# Patient Record
Sex: FEMALE | Race: WHITE | NOT HISPANIC OR LATINO | URBAN - NONMETROPOLITAN AREA
[De-identification: names, ages, dates, MRNs, and addresses within clinical notes are randomized per-mention and may not be internally consistent; named-entity substitution may affect disease eponyms.]

---

## 2017-05-09 ENCOUNTER — OFFICE VISIT (OUTPATIENT)
Dept: CARDIOLOGY | Facility: CLINIC | Age: 62
End: 2017-05-09
Payer: COMMERCIAL

## 2017-05-09 VITALS
WEIGHT: 117 LBS | HEART RATE: 61 BPM | OXYGEN SATURATION: 98 % | BODY MASS INDEX: 20.73 KG/M2 | DIASTOLIC BLOOD PRESSURE: 80 MMHG | HEIGHT: 63 IN | SYSTOLIC BLOOD PRESSURE: 120 MMHG

## 2017-05-09 DIAGNOSIS — Z13.6 SCREENING FOR ISCHEMIC HEART DISEASE: ICD-10-CM

## 2017-05-09 DIAGNOSIS — E78.5 OTHER AND UNSPECIFIED HYPERLIPIDEMIA: ICD-10-CM

## 2017-05-09 DIAGNOSIS — E03.9 HYPOTHYROIDISM, UNSPECIFIED TYPE: ICD-10-CM

## 2017-05-09 DIAGNOSIS — Z82.49 FAMILY HISTORY OF HEART DISEASE: ICD-10-CM

## 2017-05-09 LAB — EKG IMPRESSION: NORMAL

## 2017-05-09 PROCEDURE — 93000 ELECTROCARDIOGRAM COMPLETE: CPT | Performed by: INTERNAL MEDICINE

## 2017-05-09 PROCEDURE — 99204 OFFICE O/P NEW MOD 45 MIN: CPT | Performed by: INTERNAL MEDICINE

## 2017-05-09 RX ORDER — ALPRAZOLAM 0.5 MG/1
0.5 TABLET ORAL NIGHTLY PRN
Refills: 0 | COMMUNITY
Start: 2017-04-20 | End: 2024-01-11

## 2017-05-09 RX ORDER — ROSUVASTATIN CALCIUM 5 MG/1
5 TABLET, COATED ORAL EVERY EVENING
Qty: 30 TAB | Refills: 11 | Status: SHIPPED | OUTPATIENT
Start: 2017-05-09 | End: 2021-03-30

## 2017-05-09 RX ORDER — ZOLPIDEM TARTRATE 10 MG/1
10 TABLET ORAL NIGHTLY PRN
Refills: 0 | COMMUNITY
Start: 2017-03-16

## 2017-05-09 RX ORDER — ROSUVASTATIN CALCIUM 40 MG/1
40 TABLET, COATED ORAL DAILY
Qty: 30 TAB | Refills: 3 | Status: SHIPPED | OUTPATIENT
Start: 2017-05-09 | End: 2017-05-09

## 2017-05-09 RX ORDER — ROSUVASTATIN CALCIUM 40 MG/1
5 TABLET, COATED ORAL DAILY
Qty: 30 TAB | Refills: 4 | Status: SHIPPED | OUTPATIENT
Start: 2017-05-09 | End: 2017-05-09

## 2017-05-09 RX ORDER — LEVOTHYROXINE SODIUM 0.03 MG/1
25 TABLET ORAL
Refills: 0 | COMMUNITY
Start: 2017-05-07 | End: 2023-01-12 | Stop reason: SDUPTHER

## 2017-05-09 NOTE — PROGRESS NOTES
Cardiology Consult Note:    Fawn Dickinson  Date & Time note created:    5/9/2017   1:29 PM       Patient ID:  Name:             Ana María Philip     YOB: 1955  Age:                 62 y.o.  female   MRN:               [id number]                                                             Chief Complaint:   Chief Complaint   Patient presents with   • New Patient             History of Present Illness:   Ana María Philip has strong FHx CAD and herself has high cholesterol; Most recent; ; To establish with us to assess CV risk and management; active and denies cardiac sx's;       Review of Systems:     Constitutional: Denies fevers, Denies weight changes  Eyes: Denies changes in vision, no eye pain  Ears/Nose/Throat/Mouth: Denies nasal congestion or sore throat   Cardiovascular: Denies chest pain or palpitations   Respiratory: Denies shortness of breath , Denies cough  Gastrointestinal/Hepatic: Denies abdominal pain, nausea, vomiting, diarrhea, constipation or GI bleeding   Genitourinary: Denies bladder dysfunction, dysuria or frequency  Musculoskeletal/Rheum: Denies  joint pain and swelling   Skin/Breast: Denies rash, denies breast lumps or discharge  Neurological: Denies headache, confusion, memory loss or focal weakness/parasthesias  Psychiatric: denies mood disorder   Endocrine: denies hx of diabetes or thyroid dysfunction  Heme/Oncology/Lymph Nodes: Denies enlarged lymph nodes, denies bruising or known bleeding disorder  Allergic/Immunologic: Denies hx of allergies      All other systems were reviewed and are negative (AMA/CMS criteria)    Past Medical History:   History reviewed. No pertinent past medical history.      Past Surgical History:  History reviewed. No pertinent past surgical history.    Hospital Medications:    Current outpatient prescriptions:   •  zolpidem (AMBIEN) 10 MG Tab, Take 10 mg by mouth., Disp: , Rfl: 0  •  sertraline (ZOLOFT) 50 MG Tab, , Disp: , Rfl: 0  •  levothyroxine  "(SYNTHROID) 25 MCG Tab, , Disp: , Rfl: 0  •  alprazolam (XANAX) 0.5 MG Tab, , Disp: , Rfl: 0  •  rosuvastatin (CRESTOR) 40 MG tablet, Take 0.5 Tabs by mouth every day., Disp: 30 Tab, Rfl: 4    Current Outpatient Medications:  Current Outpatient Prescriptions   Medication Sig Dispense Refill   • zolpidem (AMBIEN) 10 MG Tab Take 10 mg by mouth.  0   • sertraline (ZOLOFT) 50 MG Tab   0   • levothyroxine (SYNTHROID) 25 MCG Tab   0   • alprazolam (XANAX) 0.5 MG Tab   0   • rosuvastatin (CRESTOR) 40 MG tablet Take 0.5 Tabs by mouth every day. 30 Tab 4     No current facility-administered medications for this visit.         Medication Allergy/Sensitivities:  Allergies   Allergen Reactions   • Pcn [Penicillins] Rash     Local reaction       Family History:  History reviewed. No pertinent family history.    Social History:  Social History     Social History   • Marital Status: Unknown     Spouse Name: N/A   • Number of Children: N/A   • Years of Education: N/A     Occupational History   • Not on file.     Social History Main Topics   • Smoking status: Never Smoker    • Smokeless tobacco: Never Used   • Alcohol Use: Yes      Comment: glass of week 4 to 5 times a week   • Drug Use: No   • Sexual Activity: Not Currently     Other Topics Concern   • Not on file     Social History Narrative   • No narrative on file         Physical Exam:  Vitals  Weight/BMI: Body mass index is 20.73 kg/(m^2).  Blood pressure 120/80, pulse 61, height 1.6 m (5' 3\"), weight 53.071 kg (117 lb), SpO2 98 %.  Filed Vitals:    05/09/17 1256   BP: 120/80   Pulse: 61   Height: 1.6 m (5' 3\")   Weight: 53.071 kg (117 lb)   SpO2: 98%     Oxygen Therapy:  Pulse Oximetry: 98 %  General Appearance:   Well developed, Well nourished, No acute distress, Non-toxic appearance.   HENT:  Normocephalic, Atraumatic, Oropharynx moist mucous membranes, Dentition: Mallampati 1 OP, Nose normal.    Eyes:  PERRLA, EOMI, Conjunctiva normal, No discharge.  Neck:  Normal range of " motion, No cervical tenderness, Supple, No stridor, no JVD .  No thyromegaly.  No carotid bruit.  Cardiovascular:  Normal heart rate, Normal rhythm,  S1, S2, no S3,  S4; No gallops; 1/6 SE murmurs at MLSB, No rubs, .   Extremitites with intact distal pulses, no cyanosis, clubbing or edema.  No heaves, thrills, HJR;  Peripheral pulses: carotid 2+, brachial 2+, radial 2+, ulnar 2+, femoral 2+, popliteal 2+, PT 2+, DP 2+;  Lungs:  Respiratory effort is normal. Normal breath sounds, breath sounds clear to auscultation bilaterally,  no rales, no rhonchi, no wheezing.   Abdomen: Bowel sounds normal, Soft, No tenderness, No guarding, No rebound, No masses, No hepatosplenomegaly.  Skin: Warm, Dry, No erythema, No rash, no induration or crepitus.  Neurologic: Alert & oriented x 3, Normal motor function, Normal sensory function, No focal deficits noted, cranial nerves II through XII are normal,  normal gait.  Psychiatric: Affect normal, Judgment normal, Mood normal.      Data Review:     Records reviewed and summarized in current documentation    Lab Data Review:  No results found for: SODIUM, POTASSIUM, CHLORIDE, CO2, GLUCOSE, BUN, CREATININE, BUNCREATRAT, GLOMRATE   No results found for: PROTHROMBTM, INR   No results found for: WBC, RBC, HEMOGLOBIN, HEMATOCRIT, MCV, MCH, MCHC, MPV, NEUTSPOLYS, LYMPHOCYTES, MONOCYTES, EOSINOPHILS, BASOPHILS, HYPOCHROMIA, ANISOCYTOSIS     Imaging/Procedures Review:    To my review shows:na    EKG To my review shows:SR  56 bpm    Assessment and Plan.   62 y.o. female has FHx CAD and hyperlipidemia; AHA 10 yr CV risk 9.1 % and will start on Low dose Crestor and titrate up; ; Long discussion about her activities, diet; Recheck FLP in 3 months;     1. Family history of heart disease    - EKG  - rosuvastatin (CRESTOR) 40 MG tablet; Take 0.5 Tabs by mouth every day.  Dispense: 30 Tab; Refill: 4    2. Hypothyroidism, unspecified type    - TSH; Future    3. Screening for ischemic heart disease    -  CBC WITHOUT DIFFERENTIAL  - COMP METABOLIC PANEL; Future  - LIPID PANEL    4. Other and unspecified hyperlipidemia    - rosuvastatin (CRESTOR) 40 MG tablet; Take 0.5 Tabs by mouth every day.  Dispense: 30 Tab; Refill: 4      1. Family history of heart disease  EKG    rosuvastatin (CRESTOR) 40 MG tablet    DISCONTINUED: rosuvastatin (CRESTOR) 40 MG tablet   2. Hypothyroidism, unspecified type  TSH   3. Screening for ischemic heart disease  CBC WITHOUT DIFFERENTIAL    COMP METABOLIC PANEL    LIPID PANEL   4. Other and unspecified hyperlipidemia  rosuvastatin (CRESTOR) 40 MG tablet    DISCONTINUED: rosuvastatin (CRESTOR) 40 MG tablet

## 2017-05-09 NOTE — Clinical Note
[facility] [address],   [address]nd [place] [address]  Phone: [phone]  Fax: [phone]              Ana María Philip  1955    Encounter Date: 5/9/2017    Mari Smith M.D.    Thank you for the referral. I had the pleasure of seeing Ana María Philip today in cardiology clinic. I've attached my visit note below. If you have any questions please feel free to give me a call anytime.      Fawn Dickinson MD, PhD, [facility]  Cardiology and Lipidology  [facility]                                                                  PROGRESS NOTE:  Cardiology Consult Note:    Fawn Dickinson  Date & Time note created:    5/9/2017   1:29 PM       Patient ID:  Name:             Ana María Philip     YOB: 1955  Age:                 62 y.o.  female   MRN:               [id number]                                                             Chief Complaint:   Chief Complaint   Patient presents with   • New Patient             History of Present Illness:   Ana María Philip has strong FHx CAD and herself has high cholesterol; Most recent; ; To establish with us to assess CV risk and management; active and denies cardiac sx's;       Review of Systems:     Constitutional: Denies fevers, Denies weight changes  Eyes: Denies changes in vision, no eye pain  Ears/Nose/Throat/Mouth: Denies nasal congestion or sore throat   Cardiovascular: Denies chest pain or palpitations   Respiratory: Denies shortness of breath , Denies cough  Gastrointestinal/Hepatic: Denies abdominal pain, nausea, vomiting, diarrhea, constipation or GI bleeding   Genitourinary: Denies bladder dysfunction, dysuria or frequency  Musculoskeletal/Rheum: Denies  joint pain and swelling   Skin/Breast: Denies rash, denies breast lumps or discharge  Neurological: Denies headache, confusion, memory loss or focal weakness/parasthesias  Psychiatric: denies  mood disorder   Endocrine: denies hx of diabetes or thyroid dysfunction  Heme/Oncology/Lymph Nodes: Denies enlarged lymph nodes, denies bruising or known bleeding disorder  Allergic/Immunologic: Denies hx of allergies      All other systems were reviewed and are negative (AMA/CMS criteria)    Past Medical History:   History reviewed. No pertinent past medical history.      Past Surgical History:  History reviewed. No pertinent past surgical history.    Hospital Medications:    Current outpatient prescriptions:   •  zolpidem (AMBIEN) 10 MG Tab, Take 10 mg by mouth., Disp: , Rfl: 0  •  sertraline (ZOLOFT) 50 MG Tab, , Disp: , Rfl: 0  •  levothyroxine (SYNTHROID) 25 MCG Tab, , Disp: , Rfl: 0  •  alprazolam (XANAX) 0.5 MG Tab, , Disp: , Rfl: 0  •  rosuvastatin (CRESTOR) 40 MG tablet, Take 0.5 Tabs by mouth every day., Disp: 30 Tab, Rfl: 4    Current Outpatient Medications:  Current Outpatient Prescriptions   Medication Sig Dispense Refill   • zolpidem (AMBIEN) 10 MG Tab Take 10 mg by mouth.  0   • sertraline (ZOLOFT) 50 MG Tab   0   • levothyroxine (SYNTHROID) 25 MCG Tab   0   • alprazolam (XANAX) 0.5 MG Tab   0   • rosuvastatin (CRESTOR) 40 MG tablet Take 0.5 Tabs by mouth every day. 30 Tab 4     No current facility-administered medications for this visit.         Medication Allergy/Sensitivities:  Allergies   Allergen Reactions   • Pcn [Penicillins] Rash     Local reaction       Family History:  History reviewed. No pertinent family history.    Social History:  Social History     Social History   • Marital Status: Unknown     Spouse Name: N/A   • Number of Children: N/A   • Years of Education: N/A     Occupational History   • Not on file.     Social History Main Topics   • Smoking status: Never Smoker    • Smokeless tobacco: Never Used   • Alcohol Use: Yes      Comment: glass of week 4 to 5 times a week   • Drug Use: No   • Sexual Activity: Not Currently     Other Topics Concern   • Not on file     Social History  "Narrative   • No narrative on file         Physical Exam:  Vitals  Weight/BMI: Body mass index is 20.73 kg/(m^2).  Blood pressure 120/80, pulse 61, height 1.6 m (5' 3\"), weight 53.071 kg (117 lb), SpO2 98 %.  Filed Vitals:    05/09/17 1256   BP: 120/80   Pulse: 61   Height: 1.6 m (5' 3\")   Weight: 53.071 kg (117 lb)   SpO2: 98%     Oxygen Therapy:  Pulse Oximetry: 98 %  General Appearance:   Well developed, Well nourished, No acute distress, Non-toxic appearance.   HENT:  Normocephalic, Atraumatic, Oropharynx moist mucous membranes, Dentition: Mallampati 1 OP, Nose normal.    Eyes:  PERRLA, EOMI, Conjunctiva normal, No discharge.  Neck:  Normal range of motion, No cervical tenderness, Supple, No stridor, no JVD .  No thyromegaly.  No carotid bruit.  Cardiovascular:  Normal heart rate, Normal rhythm,  S1, S2, no S3,  S4; No gallops; 1/6 SE murmurs at MLSB, No rubs, .   Extremitites with intact distal pulses, no cyanosis, clubbing or edema.  No heaves, thrills, HJR;  Peripheral pulses: carotid 2+, brachial 2+, radial 2+, ulnar 2+, femoral 2+, popliteal 2+, PT 2+, DP 2+;  Lungs:  Respiratory effort is normal. Normal breath sounds, breath sounds clear to auscultation bilaterally,  no rales, no rhonchi, no wheezing.   Abdomen: Bowel sounds normal, Soft, No tenderness, No guarding, No rebound, No masses, No hepatosplenomegaly.  Skin: Warm, Dry, No erythema, No rash, no induration or crepitus.  Neurologic: Alert & oriented x 3, Normal motor function, Normal sensory function, No focal deficits noted, cranial nerves II through XII are normal,  normal gait.  Psychiatric: Affect normal, Judgment normal, Mood normal.      Data Review:     Records reviewed and summarized in current documentation    Lab Data Review:  No results found for: SODIUM, POTASSIUM, CHLORIDE, CO2, GLUCOSE, BUN, CREATININE, BUNCREATRAT, GLOMRATE   No results found for: PROTHROMBTM, INR   No results found for: WBC, RBC, HEMOGLOBIN, HEMATOCRIT, MCV, MCH, " MCHC, MPV, NEUTSPOLYS, LYMPHOCYTES, MONOCYTES, EOSINOPHILS, BASOPHILS, HYPOCHROMIA, ANISOCYTOSIS     Imaging/Procedures Review:    To my review shows:na    EKG To my review shows:SR  56 bpm    Assessment and Plan.   62 y.o. female has FHx CAD and hyperlipidemia; AHA 10 yr CV risk 9.1 % and will start on Low dose Crestor and titrate up; ; Long discussion about her activities, diet; Recheck FLP in 3 months;     1. Family history of heart disease    - EKG  - rosuvastatin (CRESTOR) 40 MG tablet; Take 0.5 Tabs by mouth every day.  Dispense: 30 Tab; Refill: 4    2. Hypothyroidism, unspecified type    - TSH; Future    3. Screening for ischemic heart disease    - CBC WITHOUT DIFFERENTIAL  - COMP METABOLIC PANEL; Future  - LIPID PANEL    4. Other and unspecified hyperlipidemia    - rosuvastatin (CRESTOR) 40 MG tablet; Take 0.5 Tabs by mouth every day.  Dispense: 30 Tab; Refill: 4      1. Family history of heart disease  EKG    rosuvastatin (CRESTOR) 40 MG tablet    DISCONTINUED: rosuvastatin (CRESTOR) 40 MG tablet   2. Hypothyroidism, unspecified type  TSH   3. Screening for ischemic heart disease  CBC WITHOUT DIFFERENTIAL    COMP METABOLIC PANEL    LIPID PANEL   4. Other and unspecified hyperlipidemia  rosuvastatin (CRESTOR) 40 MG tablet    DISCONTINUED: rosuvastatin (CRESTOR) 40 MG tablet         Mari Smith M.D.  [address] [facility] [address]  VIA Facsimile: [phone]

## 2017-05-09 NOTE — MR AVS SNAPSHOT
"        Ana María Philip   2017 1:00 PM   Office Visit   MRN: [id number]    Department:  Heart [facility] [[email or web address]] [place]   Dept Phone:  [phone]    Description:  Female : 1955   Provider:  Fawn Dickinson MD,[facility]           Reason for Visit     New Patient           Allergies as of 2017     Allergen Noted Reactions    Pcn [Penicillins] 2017   Rash    Local reaction      You were diagnosed with     Family history of heart disease   [9208893]       Hypothyroidism, unspecified type   [4365462]       Screening for ischemic heart disease   [V81.0.ICD-9-CM]       Other and unspecified hyperlipidemia   [272.4.ICD-9-CM]         Vital Signs     Blood Pressure Pulse Height Weight Body Mass Index Oxygen Saturation    120/80 mmHg 61 1.6 m (5' 3\") 53.071 kg (117 lb) 20.73 kg/m2 98%    Smoking Status                   Never Smoker            Basic Information     Date Of Birth Sex Race Ethnicity Preferred Language    1955 Female White Non- English      Your appointments     Sep 05, 2017  1:20 PM   FOLLOW UP with Fawn Dickinson MD,[facility] ([facility]    [address] [place] [address]  2nd Floor  [facility] [address] [phone]              Health Maintenance     Patient has no pending health maintenance at this time      Results       Current Immunizations     No immunizations on file.      Below and/or attached are the medications your provider expects you to take. Review all of your home medications and newly ordered medications with your provider and/or pharmacist. Follow medication instructions as directed by your provider and/or pharmacist. Please keep your medication list with you and share with your provider. Update the information when medications are discontinued, doses are changed, or new medications (including over-the-counter products) are added; and carry medication information at all times in the event of emergency situations     Allergies:  (Not " on file)          Medications  Valid as of: May 09, 2017 -  1:50 PM    Generic Name Brand Name Tablet Size Instructions for use    ALPRAZolam (Tab) XANAX 0.5 MG         Levothyroxine Sodium (Tab) SYNTHROID 25 MCG         Rosuvastatin Calcium (Tab) CRESTOR 5 MG Take 1 Tab by mouth every evening.        Sertraline HCl (Tab) ZOLOFT 50 MG         Zolpidem Tartrate (Tab) AMBIEN 10 MG Take 10 mg by mouth.        .                 Medicines prescribed today were sent to:     None      Medication refill instructions:       If your prescription bottle indicates you have medication refills left, it is not necessary to call your provider’s office. Please contact your pharmacy and they will refill your medication.    If your prescription bottle indicates you do not have any refills left, you may request refills at any time through one of the following ways: The online [employer] system (except Urgent Care), by calling your provider’s office, or by asking your pharmacy to contact your provider’s office with a refill request. Medication refills are processed only during regular business hours and may not be available until the next business day. Your provider may request additional information or to have a follow-up visit with you prior to refilling your medication.   *Please Note: Medication refills are assigned a new Rx number when refilled electronically. Your pharmacy may indicate that no refills were authorized even though a new prescription for the same medication is available at the pharmacy. Please request the medicine by name with the pharmacy before contacting your provider for a refill.        Your To Do List     Future Labs/Procedures Complete By Expires    COMP METABOLIC PANEL  As directed 5/9/2018    TSH  As directed 5/9/2018         [employer] Access Code: [email or web address]  Expires: 6/8/2017  1:44 PM    [employer]  A secure, online tool to manage your health information     [employer]’s [employer]® is a secure, online tool  that connects you to your personalized health information from the privacy of your home -- day or night - making it very easy for you to manage your healthcare. Once the activation process is completed, you can even access your medical information using the Visual[employer] gaye, which is available for free in the Apple Gaye store or Google Play store.     [employer] provides the following levels of access (as shown below):   My Chart Features   [facility] Doctor [facility]  Specialists [facility]  Urgent  Care [facility]  Primary Care  Doctor   Email your healthcare team securely and privately 24/7 X X X    Manage appointments: schedule your next appointment; view details of past/upcoming appointments X      Request prescription refills. X      View recent personal medical records, including lab and immunizations X X X X   View health record, including health history, allergies, medications X X X X   Read reports about your outpatient visits, procedures, consult and ER notes X X X X   See your discharge summary, which is a recap of your hospital and/or ER visit that includes your diagnosis, lab results, and care plan. X X       How to register for [employer]:  1. Go to  [email or web address].  2. Click on the Sign Up Now box, which takes you to the New Member Sign Up page. You will need to provide the following information:  a. Enter your Visual[employer] Access Code exactly as it appears at the top of this page. (You will not need to use this code after you’ve completed the sign-up process. If you do not sign up before the expiration date, you must request a new code.)   b. Enter your date of birth.   c. Enter your home email address.   d. Click Submit, and follow the next screen’s instructions.  3. Create a [employer] ID. This will be your [employer] login ID and cannot be changed, so think of one that is secure and easy to remember.  4. Create a [employer] password. You can change your password at any time.  5. Enter your Password Reset  Question and Answer. This can be used at a later time if you forget your password.   6. Enter your e-mail address. This allows you to receive e-mail notifications when new information is available in [employer].  7. Click Sign Up. You can now view your health information.    For assistance activating your [employer] account, call [phone]

## 2017-08-31 LAB
ALBUMIN SERPL-MCNC: 4.6 G/DL (ref 3.6–4.8)
ALBUMIN/GLOB SERPL: 2.1 {RATIO} (ref 1.2–2.2)
ALP SERPL-CCNC: 78 IU/L (ref 39–117)
ALT SERPL-CCNC: 20 IU/L (ref 0–32)
AST SERPL-CCNC: 24 IU/L (ref 0–40)
BILIRUB SERPL-MCNC: 0.4 MG/DL (ref 0–1.2)
BUN SERPL-MCNC: 15 MG/DL (ref 8–27)
BUN/CREAT SERPL: 21 (ref 12–28)
CALCIUM SERPL-MCNC: 10.1 MG/DL (ref 8.7–10.3)
CHLORIDE SERPL-SCNC: 101 MMOL/L (ref 96–106)
CHOLEST SERPL-MCNC: 171 MG/DL (ref 100–199)
CO2 SERPL-SCNC: 24 MMOL/L (ref 18–29)
COMMENT 011824: NORMAL
CREAT SERPL-MCNC: 0.73 MG/DL (ref 0.57–1)
ERYTHROCYTE [DISTWIDTH] IN BLOOD BY AUTOMATED COUNT: 14 % (ref 12.3–15.4)
GLOBULIN SER CALC-MCNC: 2.2 G/DL (ref 1.5–4.5)
GLUCOSE SERPL-MCNC: 90 MG/DL (ref 65–99)
HCT VFR BLD AUTO: 42.8 % (ref 34–46.6)
HDLC SERPL-MCNC: 89 MG/DL
HGB BLD-MCNC: 14 G/DL (ref 11.1–15.9)
LDLC SERPL CALC-MCNC: 69 MG/DL (ref 0–99)
MCH RBC QN AUTO: 30 PG (ref 26.6–33)
MCHC RBC AUTO-ENTMCNC: 32.7 G/DL (ref 31.5–35.7)
MCV RBC AUTO: 92 FL (ref 79–97)
NRBC BLD AUTO-RTO: NORMAL %
PLATELET # BLD AUTO: 227 X10E3/UL (ref 150–379)
POTASSIUM SERPL-SCNC: 4.5 MMOL/L (ref 3.5–5.2)
PROT SERPL-MCNC: 6.8 G/DL (ref 6–8.5)
RBC # BLD AUTO: 4.66 X10E6/UL (ref 3.77–5.28)
SODIUM SERPL-SCNC: 142 MMOL/L (ref 134–144)
TRIGL SERPL-MCNC: 67 MG/DL (ref 0–149)
TSH SERPL DL<=0.005 MIU/L-ACNC: 3.05 UIU/ML (ref 0.45–4.5)
VLDLC SERPL CALC-MCNC: 13 MG/DL (ref 5–40)
WBC # BLD AUTO: 5.3 X10E3/UL (ref 3.4–10.8)

## 2017-09-05 ENCOUNTER — OFFICE VISIT (OUTPATIENT)
Dept: CARDIOLOGY | Facility: CLINIC | Age: 62
End: 2017-09-05
Payer: COMMERCIAL

## 2017-09-05 VITALS
HEIGHT: 63 IN | DIASTOLIC BLOOD PRESSURE: 70 MMHG | BODY MASS INDEX: 20.55 KG/M2 | WEIGHT: 116 LBS | HEART RATE: 58 BPM | SYSTOLIC BLOOD PRESSURE: 100 MMHG | OXYGEN SATURATION: 95 %

## 2017-09-05 DIAGNOSIS — E78.5 OTHER AND UNSPECIFIED HYPERLIPIDEMIA: ICD-10-CM

## 2017-09-05 DIAGNOSIS — I34.1 MITRAL VALVE PROLAPSE: ICD-10-CM

## 2017-09-05 DIAGNOSIS — Z13.6 SCREENING FOR ISCHEMIC HEART DISEASE: ICD-10-CM

## 2017-09-05 DIAGNOSIS — Z82.49 FAMILY HISTORY OF HEART DISEASE: ICD-10-CM

## 2017-09-05 PROCEDURE — 99214 OFFICE O/P EST MOD 30 MIN: CPT | Performed by: INTERNAL MEDICINE

## 2017-09-05 NOTE — LETTER
[facility] [address],   [address]nd Floor  [place] [address]  Phone: [phone]  Fax: [phone]              Ana María Philip  1955    Encounter Date: 9/5/2017    Mari Smith M.D.    Thank you for the referral. I had the pleasure of seeing Ana María Philip today in cardiology clinic. I've attached my visit note below. If you have any questions please feel free to give me a call anytime.      Fawn Dickinson MD, PhD, [facility]  Cardiology and Lipidology  [facility]                                                                    PROGRESS NOTE:  Chief Complaint   Patient presents with   • Follow-Up     lab review         This patient is an established female who is here today to discuss:  strong FHx CAD and herself has high cholesterol; Most recent; ; To establish with us to assess CV risk and management; active and denies cardiac sx's;     There are no active problems to display for this patient.      No past medical history on file.  No past surgical history on file.  Social History     Social History   • Marital status: Unknown     Spouse name: N/A   • Number of children: N/A   • Years of education: N/A     Social History Main Topics   • Smoking status: Never Smoker   • Smokeless tobacco: Never Used   • Alcohol use Yes      Comment: glass of week 4 to 5 times a week   • Drug use: No   • Sexual activity: Not Currently     Other Topics Concern   • Not on file     Social History Narrative   • No narrative on file     History reviewed. No pertinent family history.    Current Outpatient Prescriptions   Medication Sig Dispense Refill   • zolpidem (AMBIEN) 10 MG Tab Take 10 mg by mouth.  0   • sertraline (ZOLOFT) 50 MG Tab   0   • levothyroxine (SYNTHROID) 25 MCG Tab   0   • alprazolam (XANAX) 0.5 MG Tab   0   • rosuvastatin (CRESTOR) 5 MG Tab Take 1 Tab by mouth every evening. 30 Tab 11     No current  "facility-administered medications for this visit.      Pcn [penicillins]    Review of Systems:     Constitutional: Denies fevers, Denies weight changes  Eyes: Denies changes in vision, no eye pain  Ears/Nose/Throat/Mouth: Denies nasal congestion or sore throat   Cardiovascular: Denies chest pain or palpitations   Respiratory: Denies shortness of breath , Denies cough  Gastrointestinal/Hepatic: Denies abdominal pain, nausea, vomiting, diarrhea, constipation or GI bleeding   Genitourinary: Denies bladder dysfunction, dysuria or frequency  Musculoskeletal/Rheum: Denies  joint pain and swelling   Skin/Breast: Denies rash, denies breast lumps or discharge  Neurological: Denies headache, confusion, memory loss or focal weakness/parasthesias  Psychiatric: denies mood disorder   Endocrine: denies hx of diabetes or thyroid dysfunction  Heme/Oncology/Lymph Nodes: Denies enlarged lymph nodes, denies brusing or known bleeding disorder  Allergic/Immunologic: Denies hx of allergies      All other systems were reviewed and are negative (AMA/CMS criteria)      Blood pressure 100/70, pulse (!) 58, height 1.6 m (5' 3\"), weight 52.6 kg (116 lb), SpO2 95 %.  General Appearance:   Well developed, Well nourished, No acute distress, Non-toxic appearance.   HENT:  Normocephalic, Atraumatic, Oropharynx moist mucous membranes, Dentition: Mallampati 1 OP, Nose normal.    Eyes:  PERRLA, EOMI, Conjunctiva normal, No discharge.  Neck:  Normal range of motion, No cervical tenderness, Supple, No stridor, no JVD .  No thyromegaly.  No carotid bruit.  Cardiovascular:  Normal heart rate, Normal rhythm,  S1, S2, no S3,  S4; No gallops; 1/6 SE murmurs at MLSB, No rubs, .   Extremitites with intact distal pulses, no cyanosis, clubbing or edema.  No heaves, thrills, HJR;  Peripheral pulses: carotid 2+, brachial 2+, radial 2+, ulnar 2+, femoral 2+, popliteal 2+, PT 2+, DP 2+;  Lungs:  Respiratory effort is normal. Normal breath sounds, breath sounds clear " to auscultation bilaterally,  no rales, no rhonchi, no wheezing.   Abdomen: Bowel sounds normal, Soft, No tenderness, No guarding, No rebound, No masses, No hepatosplenomegaly.  Skin: Warm, Dry, No erythema, No rash, no induration or crepitus.  Neurologic: Alert & oriented x 3, Normal motor function, Normal sensory function, No focal deficits noted, cranial nerves II through XII are normal,  normal gait.  Psychiatric: Affect normal, Judgment normal, Mood normal.     Results for JOSSELIN SUAREZ (MRN [id number]) as of 9/5/2017 13:42   Ref. Range 5/9/2017 13:05 8/30/2017 08:07   WBC Latest Ref Range: 3.4 - 10.8 x10E3/uL  5.3   RBC Latest Ref Range: 3.77 - 5.28 x10E6/uL  4.66   Hemoglobin Latest Ref Range: 11.1 - 15.9 g/dL  14.0   Hematocrit Latest Ref Range: 34.0 - 46.6 %  42.8   MCV Latest Ref Range: 79 - 97 fL  92   MCH Latest Ref Range: 26.6 - 33.0 pg  30.0   MCHC Latest Ref Range: 31.5 - 35.7 g/dL  32.7   RDW Latest Ref Range: 12.3 - 15.4 %  14.0   Platelet Count Latest Ref Range: 150 - 379 x10E3/uL  227   Nucleated RBC Unknown  CANCELED   Sodium Latest Ref Range: 134 - 144 mmol/L  142   Potassium Latest Ref Range: 3.5 - 5.2 mmol/L  4.5   Chloride Latest Ref Range: 96 - 106 mmol/L  101   Co2 Latest Ref Range: 18 - 29 mmol/L  24   Glucose Latest Ref Range: 65 - 99 mg/dL  90   Bun Latest Ref Range: 8 - 27 mg/dL  15   Creatinine Latest Ref Range: 0.57 - 1.00 mg/dL  0.73   GFR If  Latest Ref Range: >59 mL/min/1.73  101   GFR If Non  Latest Ref Range: >59 mL/min/1.73  87   Bun-Creatinine Ratio Latest Ref Range: 12 - 28   21   Calcium Latest Ref Range: 8.7 - 10.3 mg/dL  10.1   AST(SGOT) Latest Ref Range: 0 - 40 IU/L  24   ALT(SGPT) Latest Ref Range: 0 - 32 IU/L  20   Alkaline Phosphatase Latest Ref Range: 39 - 117 IU/L  78   Total Bilirubin Latest Ref Range: 0.0 - 1.2 mg/dL  0.4   Albumin Latest Ref Range: 3.6 - 4.8 g/dL  4.6   Total Protein Latest Ref Range: 6.0 - 8.5 g/dL  6.8   Globulin  Latest Ref Range: 1.5 - 4.5 g/dL  2.2   A-G Ratio Latest Ref Range: 1.2 - 2.2   2.1   Cholesterol,Tot Latest Ref Range: 100 - 199 mg/dL  171   Triglycerides Latest Ref Range: 0 - 149 mg/dL  67   HDL Latest Ref Range: >39 mg/dL  89   LDL Latest Ref Range: 0 - 99 mg/dL  69   VLDL Cholesterol Calc Latest Ref Range: 5 - 40 mg/dL  13   Comment: Unknown  CANCELED   TSH Latest Ref Range: 0.450 - 4.500 uIU/mL  3.050       Assessment and Plan.   62 y.o. female has above mention and on Crestor 40 mg/d; with good response; She will like to decide what will be her ultimate Crestor dose, 20 vs. 40 mg/d; Will recheck FLP in 6 months    1. Screening for ischemic heart disease  See above    2. Family history of heart disease  reviewed  - LIPID PANEL    3. Other and unspecified hyperlipidemia    - LIPID PANEL      Return to clinic in  6 , months    1. Screening for ischemic heart disease     2. Family history of heart disease  LIPID PANEL   3. Other and unspecified hyperlipidemia  LIPID PANEL         Mari Smith M.D.  [address] [facility] [address]  VIA Facsimile: [phone]

## 2017-09-05 NOTE — PROGRESS NOTES
Chief Complaint   Patient presents with   • Follow-Up     lab review         This patient is an established female who is here today to discuss:  strong FHx CAD and herself has high cholesterol; Most recent; ; To establish with us to assess CV risk and management; active and denies cardiac sx's;     There are no active problems to display for this patient.      No past medical history on file.  No past surgical history on file.  Social History     Social History   • Marital status: Unknown     Spouse name: N/A   • Number of children: N/A   • Years of education: N/A     Social History Main Topics   • Smoking status: Never Smoker   • Smokeless tobacco: Never Used   • Alcohol use Yes      Comment: glass of week 4 to 5 times a week   • Drug use: No   • Sexual activity: Not Currently     Other Topics Concern   • Not on file     Social History Narrative   • No narrative on file     History reviewed. No pertinent family history.    Current Outpatient Prescriptions   Medication Sig Dispense Refill   • zolpidem (AMBIEN) 10 MG Tab Take 10 mg by mouth.  0   • sertraline (ZOLOFT) 50 MG Tab   0   • levothyroxine (SYNTHROID) 25 MCG Tab   0   • alprazolam (XANAX) 0.5 MG Tab   0   • rosuvastatin (CRESTOR) 5 MG Tab Take 1 Tab by mouth every evening. 30 Tab 11     No current facility-administered medications for this visit.      Pcn [penicillins]    Review of Systems:     Constitutional: Denies fevers, Denies weight changes  Eyes: Denies changes in vision, no eye pain  Ears/Nose/Throat/Mouth: Denies nasal congestion or sore throat   Cardiovascular: Denies chest pain or palpitations   Respiratory: Denies shortness of breath , Denies cough  Gastrointestinal/Hepatic: Denies abdominal pain, nausea, vomiting, diarrhea, constipation or GI bleeding   Genitourinary: Denies bladder dysfunction, dysuria or frequency  Musculoskeletal/Rheum: Denies  joint pain and swelling   Skin/Breast: Denies rash, denies breast lumps or  "discharge  Neurological: Denies headache, confusion, memory loss or focal weakness/parasthesias  Psychiatric: denies mood disorder   Endocrine: denies hx of diabetes or thyroid dysfunction  Heme/Oncology/Lymph Nodes: Denies enlarged lymph nodes, denies brusing or known bleeding disorder  Allergic/Immunologic: Denies hx of allergies      All other systems were reviewed and are negative (AMA/CMS criteria)      Blood pressure 100/70, pulse (!) 58, height 1.6 m (5' 3\"), weight 52.6 kg (116 lb), SpO2 95 %.  General Appearance:   Well developed, Well nourished, No acute distress, Non-toxic appearance.   HENT:  Normocephalic, Atraumatic, Oropharynx moist mucous membranes, Dentition: Mallampati 1 OP, Nose normal.    Eyes:  PERRLA, EOMI, Conjunctiva normal, No discharge.  Neck:  Normal range of motion, No cervical tenderness, Supple, No stridor, no JVD .  No thyromegaly.  No carotid bruit.  Cardiovascular:  Normal heart rate, Normal rhythm,  S1, S2, no S3,  S4; No gallops; 1/6 SE murmurs at MLSB, No rubs, .   Extremitites with intact distal pulses, no cyanosis, clubbing or edema.  No heaves, thrills, HJR;  Peripheral pulses: carotid 2+, brachial 2+, radial 2+, ulnar 2+, femoral 2+, popliteal 2+, PT 2+, DP 2+;  Lungs:  Respiratory effort is normal. Normal breath sounds, breath sounds clear to auscultation bilaterally,  no rales, no rhonchi, no wheezing.   Abdomen: Bowel sounds normal, Soft, No tenderness, No guarding, No rebound, No masses, No hepatosplenomegaly.  Skin: Warm, Dry, No erythema, No rash, no induration or crepitus.  Neurologic: Alert & oriented x 3, Normal motor function, Normal sensory function, No focal deficits noted, cranial nerves II through XII are normal,  normal gait.  Psychiatric: Affect normal, Judgment normal, Mood normal.     Results for JOSSELIN SUAREZ (MRN [id number]) as of 9/5/2017 13:42   Ref. Range 5/9/2017 13:05 8/30/2017 08:07   WBC Latest Ref Range: 3.4 - 10.8 x10E3/uL  5.3   RBC Latest Ref " Range: 3.77 - 5.28 x10E6/uL  4.66   Hemoglobin Latest Ref Range: 11.1 - 15.9 g/dL  14.0   Hematocrit Latest Ref Range: 34.0 - 46.6 %  42.8   MCV Latest Ref Range: 79 - 97 fL  92   MCH Latest Ref Range: 26.6 - 33.0 pg  30.0   MCHC Latest Ref Range: 31.5 - 35.7 g/dL  32.7   RDW Latest Ref Range: 12.3 - 15.4 %  14.0   Platelet Count Latest Ref Range: 150 - 379 x10E3/uL  227   Nucleated RBC Unknown  CANCELED   Sodium Latest Ref Range: 134 - 144 mmol/L  142   Potassium Latest Ref Range: 3.5 - 5.2 mmol/L  4.5   Chloride Latest Ref Range: 96 - 106 mmol/L  101   Co2 Latest Ref Range: 18 - 29 mmol/L  24   Glucose Latest Ref Range: 65 - 99 mg/dL  90   Bun Latest Ref Range: 8 - 27 mg/dL  15   Creatinine Latest Ref Range: 0.57 - 1.00 mg/dL  0.73   GFR If  Latest Ref Range: >59 mL/min/1.73  101   GFR If Non  Latest Ref Range: >59 mL/min/1.73  87   Bun-Creatinine Ratio Latest Ref Range: 12 - 28   21   Calcium Latest Ref Range: 8.7 - 10.3 mg/dL  10.1   AST(SGOT) Latest Ref Range: 0 - 40 IU/L  24   ALT(SGPT) Latest Ref Range: 0 - 32 IU/L  20   Alkaline Phosphatase Latest Ref Range: 39 - 117 IU/L  78   Total Bilirubin Latest Ref Range: 0.0 - 1.2 mg/dL  0.4   Albumin Latest Ref Range: 3.6 - 4.8 g/dL  4.6   Total Protein Latest Ref Range: 6.0 - 8.5 g/dL  6.8   Globulin Latest Ref Range: 1.5 - 4.5 g/dL  2.2   A-G Ratio Latest Ref Range: 1.2 - 2.2   2.1   Cholesterol,Tot Latest Ref Range: 100 - 199 mg/dL  171   Triglycerides Latest Ref Range: 0 - 149 mg/dL  67   HDL Latest Ref Range: >39 mg/dL  89   LDL Latest Ref Range: 0 - 99 mg/dL  69   VLDL Cholesterol Calc Latest Ref Range: 5 - 40 mg/dL  13   Comment: Unknown  CANCELED   TSH Latest Ref Range: 0.450 - 4.500 uIU/mL  3.050       Assessment and Plan.   62 y.o. female has above mention and on Crestor 40 mg/d; with good response; She will like to decide what will be her ultimate Crestor dose, 20 vs. 40 mg/d; Will recheck FLP in 6 months  She has an old dx  MVP; , will order Echo   1. Screening for ischemic heart disease  See above    2. Family history of heart disease  reviewed  - LIPID PANEL    3. Other and unspecified hyperlipidemia    - LIPID PANEL      Return to clinic in  6 , months    1. Screening for ischemic heart disease     2. Family history of heart disease  LIPID PANEL   3. Other and unspecified hyperlipidemia  LIPID PANEL     ADDN: She is actuaaly only taking Crestor 5 mg/d

## 2017-09-07 ENCOUNTER — TELEPHONE (OUTPATIENT)
Dept: CARDIOLOGY | Facility: MEDICAL CENTER | Age: 62
End: 2017-09-07

## 2017-09-07 NOTE — TELEPHONE ENCOUNTER
Spoke with patient as there was some confusion as to what her dose of crestor was that she was taking - explained to her that there were multiple prescriptions in her chart and she states that she filled the 5mg daily crestor RX and has never taken 20 or 40mg of crestor.  Instructed her per EC to stay on 5mg as it seems to be working as evidence by her FLP on 8/30 - EC addended OV to reflect dose that patient is taking and other rx were discontinued in patient's profile also spoke with pharmacy and the only rx they have on file is the 5mg one.  Patient was very appreciative and will call back with any other questions or concerns and plans to call in 3 months to make f/u appt and will have FLP done 1 week prior to next f/u appt due in 6 months

## 2017-09-07 NOTE — TELEPHONE ENCOUNTER
----- Message from Angeli Morse, Med Ass't sent at 9/7/2017  9:39 AM PDT -----  Regarding: question on Crestor  There is some confusion with chart notes and what pt is actually taking.  Per med list and pt she is taking Crestor 5 mg QD and the last chart note Dr Dickinson states to cut 40mg to half.  Pt would like a call back form you.    Ginette

## 2018-04-13 LAB
ALBUMIN SERPL-MCNC: 4.8 G/DL (ref 3.6–4.8)
ALBUMIN/GLOB SERPL: 2 {RATIO} (ref 1.2–2.2)
ALP SERPL-CCNC: 77 IU/L (ref 39–117)
ALT SERPL-CCNC: 21 IU/L (ref 0–32)
AST SERPL-CCNC: 28 IU/L (ref 0–40)
BILIRUB SERPL-MCNC: 0.4 MG/DL (ref 0–1.2)
BUN SERPL-MCNC: 15 MG/DL (ref 8–27)
BUN/CREAT SERPL: 17 (ref 12–28)
CALCIUM SERPL-MCNC: 10.1 MG/DL (ref 8.7–10.3)
CHLORIDE SERPL-SCNC: 99 MMOL/L (ref 96–106)
CHOLEST SERPL-MCNC: 184 MG/DL (ref 100–199)
CO2 SERPL-SCNC: 24 MMOL/L (ref 18–29)
CREAT SERPL-MCNC: 0.88 MG/DL (ref 0.57–1)
ERYTHROCYTE [DISTWIDTH] IN BLOOD BY AUTOMATED COUNT: 14.5 % (ref 12.3–15.4)
GFR SERPLBLD CREATININE-BSD FMLA CKD-EPI: 69 ML/MIN/1.73
GFR SERPLBLD CREATININE-BSD FMLA CKD-EPI: 80 ML/MIN/1.73
GLOBULIN SER CALC-MCNC: 2.4 G/DL (ref 1.5–4.5)
GLUCOSE SERPL-MCNC: 86 MG/DL (ref 65–99)
HCT VFR BLD AUTO: 41.9 % (ref 34–46.6)
HDLC SERPL-MCNC: 97 MG/DL
HGB BLD-MCNC: 14.3 G/DL (ref 11.1–15.9)
LABORATORY COMMENT REPORT: NORMAL
LDLC SERPL CALC-MCNC: 77 MG/DL (ref 0–99)
MCH RBC QN AUTO: 30.3 PG (ref 26.6–33)
MCHC RBC AUTO-ENTMCNC: 34.1 G/DL (ref 31.5–35.7)
MCV RBC AUTO: 89 FL (ref 79–97)
NRBC BLD AUTO-RTO: NORMAL %
PLATELET # BLD AUTO: 263 X10E3/UL (ref 150–379)
POTASSIUM SERPL-SCNC: 4.2 MMOL/L (ref 3.5–5.2)
PROT SERPL-MCNC: 7.2 G/DL (ref 6–8.5)
RBC # BLD AUTO: 4.72 X10E6/UL (ref 3.77–5.28)
SODIUM SERPL-SCNC: 140 MMOL/L (ref 134–144)
TRIGL SERPL-MCNC: 51 MG/DL (ref 0–149)
TSH SERPL DL<=0.005 MIU/L-ACNC: 2.59 UIU/ML (ref 0.45–4.5)
VLDLC SERPL CALC-MCNC: 10 MG/DL (ref 5–40)
WBC # BLD AUTO: 5.7 X10E3/UL (ref 3.4–10.8)

## 2018-05-10 PROBLEM — F32.A DEPRESSION: Status: ACTIVE | Noted: 2018-05-10

## 2018-05-10 PROBLEM — G47.00 INSOMNIA: Status: ACTIVE | Noted: 2018-05-10

## 2018-05-10 PROBLEM — E03.9 HYPOTHYROIDISM: Status: ACTIVE | Noted: 2018-05-10

## 2018-05-10 PROBLEM — E78.5 HYPERLIPIDEMIA: Status: ACTIVE | Noted: 2018-05-10

## 2018-05-10 PROBLEM — Z82.49 FAMILY HISTORY OF EARLY CAD: Status: ACTIVE | Noted: 2018-05-10

## 2021-03-30 PROBLEM — F41.9 ANXIETY: Status: ACTIVE | Noted: 2021-03-30

## 2021-03-30 PROBLEM — M51.36 DDD (DEGENERATIVE DISC DISEASE), LUMBAR: Status: ACTIVE | Noted: 2021-03-30

## 2021-03-30 PROBLEM — M50.30 DDD (DEGENERATIVE DISC DISEASE), CERVICAL: Status: ACTIVE | Noted: 2021-03-30

## 2021-05-03 PROBLEM — G43.109 MIGRAINE AURA WITHOUT HEADACHE: Status: ACTIVE | Noted: 2021-05-03

## 2021-06-30 ENCOUNTER — OFFICE VISIT (OUTPATIENT)
Dept: NEUROLOGY | Facility: MEDICAL CENTER | Age: 66
End: 2021-06-30
Attending: PSYCHIATRY & NEUROLOGY
Payer: MEDICARE

## 2021-06-30 VITALS
BODY MASS INDEX: 21.13 KG/M2 | HEIGHT: 63 IN | DIASTOLIC BLOOD PRESSURE: 78 MMHG | WEIGHT: 119.27 LBS | OXYGEN SATURATION: 98 % | TEMPERATURE: 97.8 F | HEART RATE: 56 BPM | SYSTOLIC BLOOD PRESSURE: 126 MMHG

## 2021-06-30 DIAGNOSIS — R90.89 ABNORMAL FINDING ON MRI OF BRAIN: Primary | ICD-10-CM

## 2021-06-30 PROCEDURE — 99202 OFFICE O/P NEW SF 15 MIN: CPT | Performed by: PSYCHIATRY & NEUROLOGY

## 2021-06-30 PROCEDURE — 99203 OFFICE O/P NEW LOW 30 MIN: CPT | Performed by: PSYCHIATRY & NEUROLOGY

## 2021-06-30 NOTE — PROGRESS NOTES
"[facility] NEUROLOGY  NEW PATIENT VISIT    Referral source: Charles Arthur MD    CC: abnormal MRI, migraine aura without headache    HISTORY OF ILLNESS:  Ana María Philip is a 66 y.o. woman with a history most notable for hypothyroidism, HLD, depression, anxiety, and insomnia.  Today, she was unaccompanied, and she provided the following history:    Ana María underwent MRI for workup of dizziness.  This revealed a likely meningioma, and the results are summarized below.    The following is a summary of headache symptoms, presented in my standard format:    Family History: none  Age at onset: 20s-30s  Location: retro-orbital  Radiation: none  Frequency: 2 times/week  Duration: a few to >60 minutes  Headache Days/Month: 8/30  Quality: \"pressure\"  Intensity: 2/10  Aura: yes, visual \"floaters\"  Photophobia/Phonophobia/Nausea/Vomiting: yes/no/yes/no  Provoked by Physical Activity?:   Triggers: possibly stress  Associated Symptoms:   Autonomic Signs (such as ptosis, miosis, conjunctival injection, rhinorrhea, increased lacrimation):   Head Trauma:   Association with Menses:   ED Visits:   Hospitalizations:   Missed Work Days:  Sleep: 6-7 hours/night  Caffeine Intake: 1 cup of coffee/day, nothing in the afternoon  Hydration: stays well-hydrated  Nutrition: sometimes skips meals  Exercise: yes  Analgesic Overuse:     Current Medication Regimen:  - none, she lies down    Medications Tried: Response  Preventive:  -     Abortive:  - acetaminophen: helpful    Medications Not Tried:  -     MEDICAL AND SURGICAL HISTORY:  Past Medical History:   Diagnosis Date   • Depression    • Family history of early CAD    • Hyperlipidemia    • Hypothyroidism    • Insomnia      No past surgical history on file.  MEDICATIONS:  Current Outpatient Medications   Medication Sig   • sertraline (ZOLOFT) 25 MG tablet Take 25 mg by mouth.   • rizatriptan (MAXALT-MLT) 10 MG disintegrating tablet Take 1 tablet by mouth one time as needed for " Migraine for up to 1 dose.   • rosuvastatin (CRESTOR) 5 MG Tab Take 5 mg by mouth 3 times a week.   • zolpidem (AMBIEN) 10 MG Tab Take 10 mg by mouth at bedtime as needed.   • sertraline (ZOLOFT) 50 MG Tab Take 50 mg by mouth every day.   • levothyroxine (SYNTHROID) 25 MCG Tab Take 25 mcg by mouth Every morning on an empty stomach.   • alprazolam (XANAX) 0.5 MG Tab Take 0.5 mg by mouth at bedtime as needed.     SOCIAL HISTORY:  Social History     Tobacco Use   • Smoking status: Never Smoker   • Smokeless tobacco: Never Used   Substance Use Topics   • Alcohol use: Yes     Alcohol/week: 2.4 - 3.0 oz     Types: 4 - 5 Glasses of wine per week     Comment: moderately     Social History     Social History Narrative   • Not on file     FAMILY HISTORY:  No family history on file.     REVIEW OF SYSTEMS:  A ROS was completed.  Pertinent positives and negatives were included in the HPI, above.  All other systems were reviewed and are negative.    PHYSICAL EXAM:  General/Medical:  - NAD  - hair, skin, nails, and joints were normal    Neuro:  MENTAL STATUS: awake and alert; no deficits of speech or language; oriented to person, place, and time; affect was appropriate to situation    CRANIAL NERVES:    II: acuity was: J2/J1; fields intact to confrontation; pupils 3/3 to 2/2 without a relative afferent pupillary defect; discs sharp    III/IV/VI: versions intact without nystagmus    V: facial sensation symmetric to light touch    VII: facial expression symmetric    VIII: hearing intact to finger rub    IX/X: palate elevates symmetrically    XI: shoulder shrug symmetric    XII: tongue midline    MOTOR:  - bulk: normal throughout  - tone: normal throughout  Upper Extremity Strength  (R/L)    5/5   Elbow flexion 5/5   Elbow extension 5/5   Shoulder abduction 5/5     Lower Extremity Strength  (R/L)   Hip flexion 5/5   Knee extension 5/5   Knee flexion 5/5   Ankle plantarflexion 5/5   Ankle dorsiflexion 5/5     - can walk on toes  "and heels  - no pronator drift; no abnormal movements    SENSATION:  - light touch: intact over the upper and lower extremities  - vibration (R/L, seconds): NT at the great toes  - pinprick: NT  - proprioception: NT  - Romberg: absent    COORDINATION:  - finger to nose: normal, no ataxia on exam  - finger tapping: rapid and accurate, bilaterally    REFLEXES:  Reflex Right Left   BR 2+ 2+   Biceps 2+ 2+   Triceps 2+ 2+   Patellae 2+ 2+   Achilles 1+ 1+   Toes NT NT     GAIT:  - narrow base and normal  - heel-raised and toe-raised gait: intact  - tandem gait: intact    REVIEW OF IMAGING STUDIES: I reviewed the following studies:  MRI Brain:  Date: 5/11/2021  W/o and w/ contrast?: without  Indication: \"dizziness\"  Comparison: none  Impression:  \"1) There is an extra-axial mass lesion at the vertex just left of midline in the frontal region measuring 10 mm in size. Most likely represents meningioma. Further evaluation could be obtained with gadolinium-enhanced MRI.  2) Minimal nodularity along the roof of the right lateral ventricle. This could also be evaluated at the time of gadolinium-enhanced MRI. Exact etiology and significance of this finding is uncertain.  3) Sinus disease as above.\"    REVIEW OF LABORATORY STUDIES:  No recent data available for review.    ASSESSMENT:  Ana María Philip is a 66 y.o. woman with likely meningioma, migraine headaches, and a history otherwise notable for hypothyroidism, HLD, depression, anxiety, and insomnia.  I reviewed the MR imaging with Ana María during the visit.  This shows a circular, well-circumscribed, T2-hyper intense lesion near the vertex over the left frontal lobe.  I agree that this most likely represents meningioma.  We agreed upon repeat imaging with contrast in approximately 9 months.  We discussed her headaches and visual aura.  Given the fairly low frequency, low intensity, and short duration of symptoms, we agreed to hold off on both abortive and prophylactic medications " at this time.  She will continue to monitor her headache symptoms and alert me if there is a change.  We will follow-up via Zoom after the next of images are obtained.    PLAN:  Meningioma:  - repeat MRI brain w/o and w/ contrast in ~9 months    Migraine w/ Aura:  - no additional workup a this time  - get 7-9 hours of sleep per night  - drink plenty of fluids (urine should be nearly clear)  - avoid excessive caffeine intake (no more than 2 servings per day and nothing in the afternoon)  - eat regular meals (don't skip meals)  - get moderate exercise (even just a 20 minute walk daily)  - do not use analgesics (e.g., ibuprofen, acetaminophen) more than 2 days per week in order to avoid analgesic rebound headaches  - keep a headache log    Follow-Up:  - Return in about 9 months (around 3/30/2022).    Signed: Ruddy Ziegler M.D.

## 2022-02-08 ENCOUNTER — APPOINTMENT (OUTPATIENT)
Dept: RADIOLOGY | Facility: MEDICAL CENTER | Age: 67
End: 2022-02-08
Attending: PSYCHIATRY & NEUROLOGY
Payer: MEDICARE

## 2022-03-09 ENCOUNTER — HOSPITAL ENCOUNTER (OUTPATIENT)
Dept: RADIOLOGY | Facility: MEDICAL CENTER | Age: 67
End: 2022-03-09
Attending: PSYCHIATRY & NEUROLOGY
Payer: MEDICARE

## 2022-03-09 DIAGNOSIS — R90.89 ABNORMAL FINDING ON MRI OF BRAIN: ICD-10-CM

## 2022-03-09 PROCEDURE — 70553 MRI BRAIN STEM W/O & W/DYE: CPT | Mod: MG

## 2022-03-09 PROCEDURE — A9576 INJ PROHANCE MULTIPACK: HCPCS | Performed by: PSYCHIATRY & NEUROLOGY

## 2022-03-09 PROCEDURE — 700117 HCHG RX CONTRAST REV CODE 255: Performed by: PSYCHIATRY & NEUROLOGY

## 2022-03-09 RX ADMIN — GADOTERIDOL 10 ML: 279.3 INJECTION, SOLUTION INTRAVENOUS at 16:17

## 2023-06-24 PROBLEM — R90.89 OTHER ABNORMAL FINDINGS ON DIAGNOSTIC IMAGING OF CENTRAL NERVOUS SYSTEM: Status: ACTIVE | Noted: 2021-06-30

## 2023-06-24 PROBLEM — D33.4: Status: ACTIVE | Noted: 2022-11-09

## 2023-06-24 PROBLEM — M51.36 DEGENERATION OF INTERVERTEBRAL DISC OF LUMBAR REGION: Status: ACTIVE | Noted: 2021-03-30

## 2023-06-24 PROBLEM — G43.109 MIGRAINE WITH AURA: Status: ACTIVE | Noted: 2021-05-03

## 2024-03-11 PROBLEM — M75.41 SUBACROMIAL IMPINGEMENT OF RIGHT SHOULDER: Status: ACTIVE | Noted: 2024-03-11

## 2024-03-11 PROBLEM — S46.011A TRAUMATIC ROTATOR CUFF TEAR, RIGHT, INITIAL ENCOUNTER: Status: ACTIVE | Noted: 2024-03-11

## 2024-03-11 PROBLEM — M75.21 BICEPS TENDONITIS, RIGHT: Status: ACTIVE | Noted: 2024-03-11
